# Patient Record
Sex: MALE | Race: WHITE | URBAN - METROPOLITAN AREA
[De-identification: names, ages, dates, MRNs, and addresses within clinical notes are randomized per-mention and may not be internally consistent; named-entity substitution may affect disease eponyms.]

---

## 2017-12-19 ENCOUNTER — APPOINTMENT (OUTPATIENT)
Dept: GENERAL RADIOLOGY | Facility: CLINIC | Age: 47
End: 2017-12-19
Attending: PHYSICIAN ASSISTANT
Payer: OTHER MISCELLANEOUS

## 2017-12-19 ENCOUNTER — HOSPITAL ENCOUNTER (EMERGENCY)
Facility: CLINIC | Age: 47
Discharge: HOME | End: 2017-12-19
Attending: PHYSICIAN ASSISTANT | Admitting: PHYSICIAN ASSISTANT
Payer: OTHER MISCELLANEOUS

## 2017-12-19 VITALS
TEMPERATURE: 97.8 F | OXYGEN SATURATION: 98 % | DIASTOLIC BLOOD PRESSURE: 95 MMHG | WEIGHT: 203 LBS | SYSTOLIC BLOOD PRESSURE: 137 MMHG | BODY MASS INDEX: 28.31 KG/M2

## 2017-12-19 DIAGNOSIS — M89.8X1 CLAVICLE PAIN: ICD-10-CM

## 2017-12-19 DIAGNOSIS — M62.838 NECK MUSCLE SPASM: ICD-10-CM

## 2017-12-19 DIAGNOSIS — S46.911D STRAIN OF RIGHT SHOULDER, SUBSEQUENT ENCOUNTER: ICD-10-CM

## 2017-12-19 DIAGNOSIS — M25.511 ACUTE PAIN OF RIGHT SHOULDER: ICD-10-CM

## 2017-12-19 PROCEDURE — 99214 OFFICE O/P EST MOD 30 MIN: CPT | Performed by: PHYSICIAN ASSISTANT

## 2017-12-19 PROCEDURE — 96372 THER/PROPH/DIAG INJ SC/IM: CPT

## 2017-12-19 PROCEDURE — 99214 OFFICE O/P EST MOD 30 MIN: CPT | Mod: 25

## 2017-12-19 PROCEDURE — 73000 X-RAY EXAM OF COLLAR BONE: CPT | Mod: RT

## 2017-12-19 PROCEDURE — 73030 X-RAY EXAM OF SHOULDER: CPT | Mod: RT

## 2017-12-19 PROCEDURE — 25000128 H RX IP 250 OP 636: Performed by: PHYSICIAN ASSISTANT

## 2017-12-19 RX ORDER — KETOROLAC TROMETHAMINE 30 MG/ML
60 INJECTION, SOLUTION INTRAMUSCULAR; INTRAVENOUS ONCE
Status: COMPLETED | OUTPATIENT
Start: 2017-12-19 | End: 2017-12-19

## 2017-12-19 RX ORDER — CYCLOBENZAPRINE HCL 10 MG
10 TABLET ORAL
Qty: 30 TABLET | Refills: 0 | Status: SHIPPED | OUTPATIENT
Start: 2017-12-19

## 2017-12-19 RX ADMIN — KETOROLAC TROMETHAMINE 60 MG: 30 INJECTION, SOLUTION INTRAMUSCULAR at 20:44

## 2017-12-19 NOTE — ED AVS SNAPSHOT
[facility] Emergency Department    [address] [address]    Phone:  [phone]    Fax:  [phone]                                       Trent Black   MRN: [id number]    Department:  [facility] Emergency Department   Date of Visit:  12/19/2017           After Visit Summary Signature Page     I have received my discharge instructions, and my questions have been answered. I have discussed any challenges I see with this plan with the nurse or doctor.    ..........................................................................................................................................  Patient/Patient Representative Signature      ..........................................................................................................................................  Patient Representative Print Name and Relationship to Patient    ..................................................               ................................................  Date                                            Time    ..........................................................................................................................................  Reviewed by Signature/Title    ...................................................              ..............................................  Date                                                            Time

## 2017-12-19 NOTE — ED AVS SNAPSHOT
[facility] Emergency Department    [address] [address]    Phone:  [phone]    Fax:  [phone]                                       Trent Black   MRN: [id number]    Department:  [facility] Emergency Department   Date of Visit:  12/19/2017           Patient Information     Date Of Birth          1970        Your diagnoses for this visit were:     Acute pain of right shoulder     Clavicle pain     Strain of right shoulder, subsequent encounter     Neck muscle spasm        You were seen by Garo Milligan PA-C.      Discharge References/Attachments     SHOULDER PAIN, UNCERTAIN CAUSE (ENGLISH)      24 Hour Appointment Hotline       To make an appointment at any [facility] clinic, call [phone] ([phone]). If you don't have a family doctor or clinic, we will help you find one. [facility] clinics are conveniently located to serve the needs of you and your family.             Review of your medicines      START taking        Dose / Directions Last dose taken    cyclobenzaprine 10 MG tablet   Commonly known as:  FLEXERIL   Dose:  10 mg   Quantity:  30 tablet        Take 1 tablet (10 mg) by mouth nightly as needed for muscle spasms   Refills:  0          Our records show that you are taking the medicines listed below. If these are incorrect, please call your family doctor or clinic.        Dose / Directions Last dose taken    esomeprazole 40 MG CR capsule   Commonly known as:  nexIUM   Dose:  40 mg   Quantity:  31 capsule        Take 1 capsule by mouth daily. One hours before meals.   Refills:  11                Prescriptions were sent or printed at these locations (1 Prescription)                   [facility] [address]   [address] [address]    Telephone:  [phone]   Fax:  [phone]   Hours:                  E-Prescribed (1 of 1)         cyclobenzaprine (FLEXERIL) 10 MG tablet               "  Procedures and tests performed during your visit     Clavicle XR, right    Shoulder XR, 2 view, right      Orders Needing Specimen Collection     None      Pending Results     Date and Time Order Name Status Description    12/19/2017 2030 Clavicle XR, right Preliminary     12/19/2017 2030 Shoulder XR, 2 view, right Preliminary             Pending Culture Results     No orders found from 12/17/2017 to 12/20/2017.            Pending Results Instructions     If you had any lab results that were not finalized at the time of your Discharge, you can call the ED Lab Result RN at [phone]. You will be contacted by this team for any positive Lab results or changes in treatment. The nurses are available 7 days a week from 10A to 6:30P.  You can leave a message 24 hours per day and they will return your call.        Test Results From Your Hospital Stay        12/19/2017  8:49 PM      Narrative     RIGHT SHOULDER TWO VIEWS  12/19/2017 8:43 PM     HISTORY: Right shoulder pain after using a tool at work one week ago.     COMPARISON: None.        Impression     IMPRESSION: Normal.          12/19/2017  8:46 PM      Narrative     RIGHT CLAVICLE TWO VIEWS   12/19/2017 8:44 PM     HISTORY: Clavicle pain after using a tool at work one week ago.    COMPARISON: None.        Impression     IMPRESSION: Normal.                Thank you for choosing [facility]       Thank you for choosing [facility] for your care. Our goal is always to provide you with excellent care. Hearing back from our patients is one way we can continue to improve our services. Please take a few minutes to complete the written survey that you may receive in the mail after you visit with us. Thank you!        [facility] Information     [employer] lets you send messages to your doctor, view your test results, renew your prescriptions, schedule appointments and more. To sign up, go to [email or web address] . Click on \"Log in\" on the left side of the screen, which will take " "you to the Welcome page. Then click on \"Sign up Now\" on the right side of the page.     You will be asked to enter the access code listed below, as well as some personal information. Please follow the directions to create your username and password.     Your access code is: [email or web address]  Expires: 3/19/2018  9:10 PM     Your access code will  in 90 days. If you need help or a new code, please call your [facility] clinic or [phone].        Care EveryWhere ID     This is your Care EveryWhere ID. This could be used by other organizations to access your [facility] medical records  [id number]        Equal Access to Services     MIGUEL ZARAGOZA : Jacey Sutherland, sav viramontes, romeo villalobos, nela santiago. So [facility] [phone].    ATENCIÓN: Si habla español, tiene a glaser disposición servicios gratuitos de asistencia lingüística. Llame al [phone].    We comply with applicable federal civil rights laws and [place] laws. We do not discriminate on the basis of race, color, national origin, age, disability, sex, sexual orientation, or gender identity.            After Visit Summary       This is your record. Keep this with you and show to your community pharmacist(s) and doctor(s) at your next visit.                  "

## 2017-12-20 NOTE — ED PROVIDER NOTES
Chief Complaint:    Chief Complaint   Patient presents with     Shoulder Pain     rt shoulder pain started on Friday at work, seen yesterday set up for PT but can't take the pain needs something for pain        HPI: Trent Black is an 47 year old male who presents for evaluation and treatment of R shoulder pain.  Patient injured the R shoulder at work on 12/15/2017.  He was evaluated by occupational med provider that his employer sent him to.  No imaging has been done at this time.  He was Dx with R shoulder strain and is scheduled to start PT this week.  He continues to have R shoulder pain.  This has been the same since his injury.  Movement makes the pain worse.  He has been taking ibuprofen with some relief, but is having a difficult time sleeping.  He is now having some R sided neck pain.  He denies any numbness or tingling in the R extremity.  No chest pain or shortness of breath.  No muscle weakness.        ROS:  Further problem focused system review was otherwise negative.        Surgical History:  Past Surgical History:   Procedure Laterality Date     CLOSED RX PATELLA FX      RT FX     VASECTOMY          Problem List:  Patient Active Problem List   Diagnosis     GERD (gastroesophageal reflux disease)     Hyperlipidemia LDL goal <160        Allergies:  Allergies   Allergen Reactions     Penicillins Difficulty breathing        Current Meds:  No current facility-administered medications for this encounter.     Current Outpatient Prescriptions:      cyclobenzaprine (FLEXERIL) 10 MG tablet, Take 1 tablet (10 mg) by mouth nightly as needed for muscle spasms, Disp: 30 tablet, Rfl: 0     esomeprazole (NEXIUM) 40 MG capsule, Take 1 capsule by mouth daily. One hours before meals., Disp: 31 capsule, Rfl: 11     PHYSICAL EXAM:     Vital signs noted and reviewed by Garo Milligan  BP (!) 137/95  Temp 97.8  F (36.6  C) (Oral)  Wt 92.1 kg (203 lb)  SpO2 98%  BMI 28.31 kg/m2     PEFR:  General appearance:  healthy, alert and no distress  Ears: R TM - normal: no effusions, no erythema, and normal landmarks, L TM - normal: no effusions, no erythema, and normal landmarks  Eyes: R normal, L normal  Nose: normal  Oropharynx: normal  Neck: supple and no adenopathy  Lungs: normal and clear to auscultation  Heart: S1, S2 normal, no murmur, click, rub or gallop, regular rate and rhythm, chest is clear without rales or wheezing, no pedal edema, no JVD, no hepatosplenomegaly  Abdomen: Abdomen soft, non-tender without masses or organomegaly  Extremities: R shoulder - No deformity, ecchymosis, or swelling.  Limited ROM with ABduction and internal rotation.  Strnegth equal to L shoulder.  Arm is neurologically intact.       Labs:     Results for orders placed or performed during the hospital encounter of 12/19/17   Shoulder XR, 2 view, right    Narrative    RIGHT SHOULDER TWO VIEWS  12/19/2017 8:43 PM     HISTORY: Right shoulder pain after using a tool at work one week ago.     COMPARISON: None.      Impression    IMPRESSION: Normal.    Clavicle XR, right    Narrative    RIGHT CLAVICLE TWO VIEWS   12/19/2017 8:44 PM     HISTORY: Clavicle pain after using a tool at work one week ago.    COMPARISON: None.      Impression    IMPRESSION: Normal.          ASSESSMENT:     1. Acute pain of right shoulder    2. Clavicle pain    3. Strain of right shoulder, subsequent encounter    4. Neck muscle spasm           PLAN:     No imaging has been done yet, XR was negative for shoulder or clavicle fracture.  Patient given 60 Mg Toradol IM in clinic today for pain.  No ibuprofen for 6-8 hours.  Tylenol is O.K.  Rx for flexeril for neck muscle spasm.  No driving with this.  Paperwork filled out for work comp by me.  He will continue to follow Occ Med restrictions.  He was instructed to follow up with his PCP if he thinks he will need narcotic pain medication.  Patient verbalized understanding and agreed with this plan.     Garo Milligan   12/19/2017, 8:15 PM       Garo Milligan PA-C  12/19/17 2820